# Patient Record
Sex: MALE | Race: WHITE | Employment: OTHER
[De-identification: names, ages, dates, MRNs, and addresses within clinical notes are randomized per-mention and may not be internally consistent; named-entity substitution may affect disease eponyms.]

---

## 2023-06-02 ENCOUNTER — APPOINTMENT (OUTPATIENT)
Facility: HOSPITAL | Age: 73
End: 2023-06-02
Payer: COMMERCIAL

## 2023-06-02 ENCOUNTER — HOSPITAL ENCOUNTER (EMERGENCY)
Facility: HOSPITAL | Age: 73
Discharge: HOME | End: 2023-06-02
Attending: EMERGENCY MEDICINE
Payer: COMMERCIAL

## 2023-06-02 ENCOUNTER — APPOINTMENT (OUTPATIENT)
Dept: VASCULAR SURGERY | Facility: HOSPITAL | Age: 73
End: 2023-06-02
Attending: EMERGENCY MEDICINE
Payer: COMMERCIAL

## 2023-06-02 VITALS
TEMPERATURE: 98.2 F | RESPIRATION RATE: 18 BRPM | WEIGHT: 175 LBS | DIASTOLIC BLOOD PRESSURE: 60 MMHG | SYSTOLIC BLOOD PRESSURE: 171 MMHG | OXYGEN SATURATION: 98 % | BODY MASS INDEX: 26.52 KG/M2 | HEIGHT: 68 IN | HEART RATE: 89 BPM

## 2023-06-02 DIAGNOSIS — S82.425A CLOSED NONDISPLACED TRANSVERSE FRACTURE OF SHAFT OF LEFT FIBULA, INITIAL ENCOUNTER: Primary | ICD-10-CM

## 2023-06-02 DIAGNOSIS — M79.605 LEFT LEG PAIN: ICD-10-CM

## 2023-06-02 LAB — ECHO BSA: 1.95 M2

## 2023-06-02 PROCEDURE — 93971 EXTREMITY STUDY: CPT

## 2023-06-02 PROCEDURE — 99284 EMERGENCY DEPT VISIT MOD MDM: CPT

## 2023-06-02 PROCEDURE — 73590 X-RAY EXAM OF LOWER LEG: CPT

## 2023-06-02 ASSESSMENT — PAIN SCALES - GENERAL: PAINLEVEL_OUTOF10: 2

## 2023-06-02 ASSESSMENT — PAIN DESCRIPTION - LOCATION: LOCATION: LEG

## 2023-06-02 NOTE — ED TRIAGE NOTES
Pt states on May 15th he fell out of the attic in his garage. He reports a three days after the fall he noticed tender knots and some swelling to back of his left leg. Denies chest pain or SOB. Reports known left sided rib fracture after the fall.

## 2023-06-02 NOTE — ED PROVIDER NOTES
MD  [address] [id number]  [phone]    Call       [facility] [address] [facility],[address]  [facility]  [phone]  Call         DISCHARGE MEDICATIONS:  New Prescriptions    No medications on file         (Please note that portions of this note were completed with a voice recognition program.  Efforts were made to edit the dictations but occasionally words are mis-transcribed.)    Anahi Dao MD (electronically signed)  Emergency Attending Physician           Anahi Dao MD  06/02/23 0481

## 2023-06-02 NOTE — ED NOTES
Patient discharged by provider. Discharge paperwork reviewed and patient denies questions.   Leaves ambulatory and in no apparent distress       Brielle Stokes RN  06/02/23 4017

## 2023-11-27 RX ORDER — BENAZEPRIL/HYDROCHLOROTHIAZIDE 20 MG-25MG
1 TABLET ORAL DAILY
COMMUNITY

## 2023-11-27 RX ORDER — ATORVASTATIN CALCIUM 40 MG/1
40 TABLET, FILM COATED ORAL
COMMUNITY

## 2023-11-27 RX ORDER — ASPIRIN 81 MG/1
81 TABLET ORAL DAILY
COMMUNITY

## 2023-11-27 RX ORDER — LEVOTHYROXINE SODIUM 112 UG/1
112 TABLET ORAL DAILY
COMMUNITY

## 2023-11-28 ENCOUNTER — ANESTHESIA EVENT (OUTPATIENT)
Facility: HOSPITAL | Age: 73
End: 2023-11-28
Payer: MEDICARE

## 2023-11-28 ENCOUNTER — ANESTHESIA (OUTPATIENT)
Facility: HOSPITAL | Age: 73
End: 2023-11-28
Payer: MEDICARE

## 2023-11-28 ENCOUNTER — HOSPITAL ENCOUNTER (OUTPATIENT)
Facility: HOSPITAL | Age: 73
Setting detail: OUTPATIENT SURGERY
Discharge: HOME | End: 2023-11-28
Attending: SPECIALIST | Admitting: SPECIALIST
Payer: MEDICARE

## 2023-11-28 VITALS
BODY MASS INDEX: 25.59 KG/M2 | HEIGHT: 68 IN | WEIGHT: 168.87 LBS | HEART RATE: 68 BPM | OXYGEN SATURATION: 100 % | TEMPERATURE: 98.1 F | RESPIRATION RATE: 20 BRPM | SYSTOLIC BLOOD PRESSURE: 138 MMHG | DIASTOLIC BLOOD PRESSURE: 63 MMHG

## 2023-11-28 PROCEDURE — 3600007502: Performed by: SPECIALIST

## 2023-11-28 PROCEDURE — 3700000001 HC ADD 15 MINUTES (ANESTHESIA): Performed by: SPECIALIST

## 2023-11-28 PROCEDURE — 2580000003 HC RX 258: Performed by: SPECIALIST

## 2023-11-28 PROCEDURE — 3700000000 HC ANESTHESIA ATTENDED CARE: Performed by: SPECIALIST

## 2023-11-28 PROCEDURE — 7100000010 HC PHASE II RECOVERY - FIRST 15 MIN: Performed by: SPECIALIST

## 2023-11-28 PROCEDURE — 3600007512: Performed by: SPECIALIST

## 2023-11-28 PROCEDURE — 7100000011 HC PHASE II RECOVERY - ADDTL 15 MIN: Performed by: SPECIALIST

## 2023-11-28 PROCEDURE — 6360000002 HC RX W HCPCS: Performed by: NURSE ANESTHETIST, CERTIFIED REGISTERED

## 2023-11-28 RX ORDER — PROPOFOL 10 MG/ML
INJECTION, EMULSION INTRAVENOUS PRN
Status: DISCONTINUED | OUTPATIENT
Start: 2023-11-28 | End: 2023-11-28 | Stop reason: SDUPTHER

## 2023-11-28 RX ORDER — SODIUM CHLORIDE 9 MG/ML
INJECTION, SOLUTION INTRAVENOUS CONTINUOUS
Status: DISCONTINUED | OUTPATIENT
Start: 2023-11-28 | End: 2023-11-28 | Stop reason: HOSPADM

## 2023-11-28 RX ORDER — SODIUM CHLORIDE 0.9 % (FLUSH) 0.9 %
5-40 SYRINGE (ML) INJECTION PRN
Status: DISCONTINUED | OUTPATIENT
Start: 2023-11-28 | End: 2023-11-28 | Stop reason: HOSPADM

## 2023-11-28 RX ADMIN — SODIUM CHLORIDE: 9 INJECTION, SOLUTION INTRAVENOUS at 11:03

## 2023-11-28 RX ADMIN — PROPOFOL 50 MG: 10 INJECTION, EMULSION INTRAVENOUS at 11:07

## 2023-11-28 RX ADMIN — PROPOFOL 200 MCG/KG/MIN: 10 INJECTION, EMULSION INTRAVENOUS at 11:10

## 2023-11-28 ASSESSMENT — PAIN - FUNCTIONAL ASSESSMENT: PAIN_FUNCTIONAL_ASSESSMENT: 0-10

## 2023-11-28 NOTE — PROGRESS NOTES
Endoscopy discharge instructions have been reviewed and given to patient. The patient verbalized understanding and acceptance of instructions. Dr. Rolando Hough discussed with brother procedure findings and next steps.

## 2023-11-28 NOTE — DISCHARGE INSTRUCTIONS
scoop in water daily. If hemorrhoid symptoms continue you could see Dr. Abril Thomas or Dr. Hao Fair with Colorectal Surgery [phone]) or Dr. Ebony Moseley of the [facility] ([phone]). It was an honor to be your doctor today. Please let me or my office staff know if you have any feedback about today's procedure. Azul Luther MD    Colonoscopy saves lives, and can prevent colon cancer. Everyone aged 48 or older needs colonoscopy.   Tell your family and friends: get the test!

## 2023-11-28 NOTE — PROGRESS NOTES
Nuris Going  1950  [id number]    Situation:  Verbal report received from: Nilsa MCALLISTER  Procedure: Procedure(s):  COLONOSCOPY    Background:    Preoperative diagnosis: Hematochezia [K92.1]  Postoperative diagnosis: * No post-op diagnosis entered *    :  Dr. Chivo Thompson  Assistant(s): Circulator: Vlad Ojeda RN  Endoscopy Technician: Deborah Castellanos    Specimens: [unfilled]  H. Pylori  No    Assessment:  Intra-procedure medications   Anesthesia gave intra-procedure sedation and medications, see anesthesia flow sheet Yes    Intravenous fluids: NS@ KVO     Vital signs stable yes    Abdominal assessment: round and soft  yes    Recommendation:  Discharge patient per MD order yes.   Family or Friend - brother  Permission to share finding with family or friend Yes

## 2023-11-28 NOTE — ANESTHESIA PRE PROCEDURE
> = 3 FB   Dental:    (+) poor dentition      Pulmonary: breath sounds clear to auscultation                             Cardiovascular:  Exercise tolerance: good (>4 METS),   (+) hypertension:,       NYHA Classification: I    Rhythm: regular  Rate: normal           Beta Blocker:  Not on Beta Blocker         Neuro/Psych:   Negative Neuro/Psych ROS              GI/Hepatic/Renal:   (+) bowel prep,           Endo/Other:    (+) hypothyroidism, blood dyscrasia: anemia:., .          Pt had no PAT visit       Abdominal:             Vascular: Other Findings:           Anesthesia Plan      MAC     ASA 2       Induction: intravenous. MIPS: Postoperative opioids intended and Prophylactic antiemetics administered. Anesthetic plan and risks discussed with patient. Plan discussed with CRNA.                     Austin Scott MD   11/28/2023

## 2023-11-28 NOTE — PERIOP NOTE
Initial RN admission and assessment performed and documented in Endoscopy navigator. Patient evaluated by anesthesia in pre-procedure holding. All procedural vital signs, airway assessment, and level of consciousness information monitored and recorded by anesthesia staff on the anesthesia record. Report received from [address] [place] [address] post procedure. Patient transported to recovery area by RN.     Report given to post procedure RNNick was pre-cleaned at bedside immediately following procedure by Valeri Lui.

## 2023-11-28 NOTE — H&P
Date: 2023 3:30 PM  Patient Name: Leopold Grumbling. [facility]  Account #: [id number]  Gender: Male   (age): 1950 (68)  Provider:    Mookie Francis. Tanya Mart MD     Referring Physician:    Juan Pedroza. [address] [place]  [phone] (phone)  [phone] (fax)     Chief Complaint:    Hematochezia     History of Present Illness:  Overdue for colonoscopy. Had 2-3 Bm with blood 4 weeks ago. None since. I recommended colonoscopy. He agreed to schedule. With hypothesis of hemorrhoidal blood loss I suggested high fiber intake. Could refer for hemorrhoid intervention if this does not resolve symptoms. Past Medical History  Medical Conditions:   Colon polyps  Enlarged Prostate (BPH)  Hemorrhoids  High blood pressure  High cholesterol  Thyroid disease  Surgical Procedures:   Colonoscopy 2011  Dx Studies:   Colonoscopy, 2011  Medications:   aspirin 81 mg Take 1 tablet by mouth once a day  atorvastatin 40 mg Take 1 tablet by mouth once a day  benazepril-hydrochlorothiazide 20-25 mg Take 1 tablet by mouth once a day  levothyroxine 112 mcg Take 1 tablet by mouth once a day  Allergies:   Penicillins  Immunizations:   COVID Vaccine, 10/8/2022  Influenza vaccination, 2022  Social History  Alcohol:   None  Tobacco:   Former smoker  Drugs:   None  Exercise:   Exercise less than 3 times a week. Caffeine:   Daily. Marital Status:    Unknown     Family History   No history of Colon Cancer, Esophogeal Cancer, GI Cancers, IBD (Crohn's or UC), Liver disease  Sister: Diagnosed with Family history of colon polyps; Review of Systems:  Cardiovascular: Denies chest pain, irregular heart beat, palpitations, peripheral edema, syncope, Sweats. Constitutional: Denies fatigue, fever, loss of appetite, weight gain, weight loss. ENMT: Denies nose bleeds, sore throat, hearing loss. Endocrine: Denies excessive thirst, heat intolerance. Eyes: Denies loss of vision.   Gastrointestinal: Presents

## 2023-11-28 NOTE — ANESTHESIA POSTPROCEDURE EVALUATION
Department of Anesthesiology  Postprocedure Note    Patient: Jackelyn Shukla  MRN: [id number]  YOB: 1950  Date of evaluation: 11/28/2023      Procedure Summary     Date: 11/28/23 Room / Location: [address] [address] / [facility] ENDOSCOPY    Anesthesia Start: 1103 Anesthesia Stop: 1128    Procedure: COLONOSCOPY (Lower GI Region) Diagnosis:       Hematochezia      (Hematochezia [K92.1])    Surgeons: Shankar Astorga MD Responsible Provider: Gabriella Price MD    Anesthesia Type: MAC ASA Status: 2          Anesthesia Type: No value filed.     Oneil Phase I: Oneil Score: 10    Oneil Phase II: Oneil Score: 10      Anesthesia Post Evaluation    Patient location during evaluation: PACU  Patient participation: complete - patient participated  Level of consciousness: awake  Pain score: 0  Airway patency: patent  Nausea & Vomiting: no nausea and no vomiting  Complications: no  Cardiovascular status: blood pressure returned to baseline  Respiratory status: acceptable  Hydration status: euvolemic  Multimodal analgesia pain management approach  Pain management: adequate

## 2023-11-28 NOTE — OP NOTE
[address] [facility] [facility] COLT Rendon MD  [phone]      2023    Colonoscopy Procedure Note  Esa James  :  1950  Sarah Medical Record Number: [id number]    Indications:     Hematochezia/melena  PCP:  Joie Phillips MD  Anesthesia/Sedation: Conscious Sedation/Moderate Sedation/GETA, see notes  Endoscopist:  Dr. Sean Valente  Complications:  None  Estimated Blood Loss:  None    Permit:  The indications, risks, benefits and alternatives were reviewed with the patient or their decision maker who was provided an opportunity to ask questions and all questions were answered. The specific risks of colonoscopy with conscious sedation were reviewed, including but not limited to anesthetic complication, bleeding, adverse drug reaction, missed lesion, infection, IV site reactions, and intestinal perforation which would lead to the need for surgical repair. Alternatives to colonoscopy including radiographic imaging, observation without testing, or laboratory testing were reviewed including the limitations of those alternatives. After considering the options and having all their questions answered, the patient or their decision maker provided both verbal and written consent to proceed. Procedure in Detail:  After obtaining informed consent, positioning of the patient in the left lateral decubitus position, and conduction of a pre-procedure pause or \"time out\" the endoscope was introduced into the anus and advanced to the cecum, which was identified by the ileocecal valve and appendiceal orifice. The quality of the colonic preparation was good. A careful inspection was made as the colonoscope was withdrawn, findings and interventions are described below. Findings: In the rectum, moderate internal hemorrhoids are noted without bleeding.       Specimens:    none    Complications:   None; patient